# Patient Record
Sex: FEMALE | Race: WHITE
[De-identification: names, ages, dates, MRNs, and addresses within clinical notes are randomized per-mention and may not be internally consistent; named-entity substitution may affect disease eponyms.]

---

## 2018-10-15 ENCOUNTER — HOSPITAL ENCOUNTER (OUTPATIENT)
Dept: HOSPITAL 47 - RADMAMWWP | Age: 43
Discharge: HOME | End: 2018-10-15
Payer: COMMERCIAL

## 2018-10-15 DIAGNOSIS — Z12.31: Primary | ICD-10-CM

## 2018-10-15 PROCEDURE — 77067 SCR MAMMO BI INCL CAD: CPT

## 2018-10-16 NOTE — MM
Reason for exam: screening  (asymptomatic).



Physical Findings:

A clinical breast exam by your physician is recommended on an annual basis and 

results should be correlated with mammographic findings.



MG Screening Mammo w CAD

Bilateral CC and MLO view(s) were taken.

No prior studies available for comparison.

There are scattered fibroglandular densities.  There is a 8mm low density anterior

depth lower inner quadrant mass that appears circumscribed in the left breast.





ASSESSMENT: Incomplete: need additional imaging evaluation, BI-RAD 0



RECOMMENDATION:

Ultrasound of the left breast.



[facility] will attempt to contact patient  to return for ultrasound.

## 2018-10-24 ENCOUNTER — HOSPITAL ENCOUNTER (OUTPATIENT)
Dept: HOSPITAL 47 - RADUSWWP | Age: 43
Discharge: HOME | End: 2018-10-24
Payer: COMMERCIAL

## 2018-10-24 DIAGNOSIS — R92.8: Primary | ICD-10-CM

## 2018-10-25 NOTE — USB
Reason for exam: additional evaluation requested from abnormal screening.



Physical Findings:

Nurse did not find any significant physical abnormalities on exam.



US Breast Workup Limited LT

Left limited breast ultrasound including focal area of concern, retroareolar and 

axilla demonstrates a 0.6 x 0.6 x 0.4cm cystic lesion at 7 o'clock.



These results were verbally communicated with the patient and result sheet given 

to the patient on 10/24/18.





ASSESSMENT: Benign, BI-RAD 2



RECOMMENDATION:

Return to routine screening mammogram schedule for both breasts.

## 2019-11-01 ENCOUNTER — HOSPITAL ENCOUNTER (OUTPATIENT)
Dept: HOSPITAL 47 - RADMAMWWP | Age: 44
Discharge: HOME | End: 2019-11-01
Attending: FAMILY MEDICINE
Payer: COMMERCIAL

## 2019-11-01 DIAGNOSIS — Z12.31: Primary | ICD-10-CM

## 2019-11-01 PROCEDURE — 77067 SCR MAMMO BI INCL CAD: CPT

## 2019-11-04 NOTE — MM
Reason for exam: screening  (asymptomatic).

Last mammogram was performed 1 year and 1 month ago.



Physical Findings:

A clinical breast exam by your physician is recommended on an annual basis and 

results should be correlated with mammographic findings.



MG Screening Mammo w CAD

Bilateral CC, MLO, and XCCL view(s) were taken.

Prior study comparison: October 15, 2018, bilateral MG screening mammo w CAD.

There are scattered fibroglandular densities.  There is chronic nodularity in the 

left breast.  No significant changes when compared with prior studies.





ASSESSMENT: Benign, BI-RAD 2



RECOMMENDATION:

Routine screening mammogram of both breasts in 1 year.

## 2020-02-12 ENCOUNTER — HOSPITAL ENCOUNTER (OUTPATIENT)
Dept: HOSPITAL 47 - RADCTMAIN | Age: 45
End: 2020-02-12
Attending: CLINICAL NURSE SPECIALIST
Payer: COMMERCIAL

## 2020-02-12 DIAGNOSIS — Z98.890: ICD-10-CM

## 2020-02-12 DIAGNOSIS — R16.0: ICD-10-CM

## 2020-02-12 DIAGNOSIS — K57.90: Primary | ICD-10-CM

## 2020-02-12 DIAGNOSIS — K76.0: ICD-10-CM

## 2020-02-12 LAB — BUN SERPL-SCNC: 11 MG/DL (ref 7–17)

## 2020-02-12 PROCEDURE — 74177 CT ABD & PELVIS W/CONTRAST: CPT

## 2020-02-12 PROCEDURE — 84520 ASSAY OF UREA NITROGEN: CPT

## 2020-02-12 PROCEDURE — 36415 COLL VENOUS BLD VENIPUNCTURE: CPT

## 2020-02-12 PROCEDURE — 82565 ASSAY OF CREATININE: CPT

## 2020-04-01 ENCOUNTER — HOSPITAL ENCOUNTER (OUTPATIENT)
Dept: HOSPITAL 47 - EC | Age: 45
Setting detail: OBSERVATION
LOS: 1 days | Discharge: HOME | End: 2020-04-02
Attending: FAMILY MEDICINE | Admitting: FAMILY MEDICINE
Payer: COMMERCIAL

## 2020-04-01 DIAGNOSIS — Z80.0: ICD-10-CM

## 2020-04-01 DIAGNOSIS — E11.9: ICD-10-CM

## 2020-04-01 DIAGNOSIS — Z90.49: ICD-10-CM

## 2020-04-01 DIAGNOSIS — Z79.84: ICD-10-CM

## 2020-04-01 DIAGNOSIS — Z91.018: ICD-10-CM

## 2020-04-01 DIAGNOSIS — Z79.899: ICD-10-CM

## 2020-04-01 DIAGNOSIS — E66.9: ICD-10-CM

## 2020-04-01 DIAGNOSIS — I20.0: Primary | ICD-10-CM

## 2020-04-01 DIAGNOSIS — R20.2: ICD-10-CM

## 2020-04-01 DIAGNOSIS — Z79.890: ICD-10-CM

## 2020-04-01 DIAGNOSIS — Z90.710: ICD-10-CM

## 2020-04-01 DIAGNOSIS — E89.0: ICD-10-CM

## 2020-04-01 DIAGNOSIS — I10: ICD-10-CM

## 2020-04-01 LAB
ALBUMIN SERPL-MCNC: 4.4 G/DL (ref 3.5–5)
ALP SERPL-CCNC: 102 U/L (ref 38–126)
ALT SERPL-CCNC: 25 U/L (ref 4–34)
ANION GAP SERPL CALC-SCNC: 11 MMOL/L
APTT BLD: 25.4 SEC (ref 22–30)
AST SERPL-CCNC: 26 U/L (ref 14–36)
BASOPHILS # BLD AUTO: 0 K/UL (ref 0–0.2)
BASOPHILS NFR BLD AUTO: 0 %
BUN SERPL-SCNC: 9 MG/DL (ref 7–17)
CALCIUM SPEC-MCNC: 9.8 MG/DL (ref 8.4–10.2)
CHLORIDE SERPL-SCNC: 101 MMOL/L (ref 98–107)
CO2 SERPL-SCNC: 23 MMOL/L (ref 22–30)
D DIMER PPP FEU-MCNC: 0.41 MG/L FEU (ref ?–0.6)
EOSINOPHIL # BLD AUTO: 0.2 K/UL (ref 0–0.7)
EOSINOPHIL NFR BLD AUTO: 2 %
ERYTHROCYTE [DISTWIDTH] IN BLOOD BY AUTOMATED COUNT: 5.16 M/UL (ref 3.8–5.4)
ERYTHROCYTE [DISTWIDTH] IN BLOOD: 13 % (ref 11.5–15.5)
GLUCOSE BLD-MCNC: 92 MG/DL (ref 75–99)
GLUCOSE SERPL-MCNC: 164 MG/DL (ref 74–99)
HCT VFR BLD AUTO: 43.4 % (ref 34–46)
HGB BLD-MCNC: 14.6 GM/DL (ref 11.4–16)
INR PPP: 0.9 (ref ?–1.2)
LYMPHOCYTES # SPEC AUTO: 2.4 K/UL (ref 1–4.8)
LYMPHOCYTES NFR SPEC AUTO: 24 %
MAGNESIUM SPEC-SCNC: 1.9 MG/DL (ref 1.6–2.3)
MCH RBC QN AUTO: 28.3 PG (ref 25–35)
MCHC RBC AUTO-ENTMCNC: 33.7 G/DL (ref 31–37)
MCV RBC AUTO: 84.1 FL (ref 80–100)
MONOCYTES # BLD AUTO: 0.2 K/UL (ref 0–1)
MONOCYTES NFR BLD AUTO: 2 %
NEUTROPHILS # BLD AUTO: 7.1 K/UL (ref 1.3–7.7)
NEUTROPHILS NFR BLD AUTO: 70 %
PLATELET # BLD AUTO: 324 K/UL (ref 150–450)
POTASSIUM SERPL-SCNC: 4.3 MMOL/L (ref 3.5–5.1)
PROT SERPL-MCNC: 8 G/DL (ref 6.3–8.2)
PT BLD: 9.4 SEC (ref 9–12)
SODIUM SERPL-SCNC: 135 MMOL/L (ref 137–145)
WBC # BLD AUTO: 10.2 K/UL (ref 3.8–10.6)

## 2020-04-01 PROCEDURE — 84484 ASSAY OF TROPONIN QUANT: CPT

## 2020-04-01 PROCEDURE — 36415 COLL VENOUS BLD VENIPUNCTURE: CPT

## 2020-04-01 PROCEDURE — 85025 COMPLETE CBC W/AUTO DIFF WBC: CPT

## 2020-04-01 PROCEDURE — 93005 ELECTROCARDIOGRAM TRACING: CPT

## 2020-04-01 PROCEDURE — 83880 ASSAY OF NATRIURETIC PEPTIDE: CPT

## 2020-04-01 PROCEDURE — 85379 FIBRIN DEGRADATION QUANT: CPT

## 2020-04-01 PROCEDURE — 80053 COMPREHEN METABOLIC PANEL: CPT

## 2020-04-01 PROCEDURE — 96366 THER/PROPH/DIAG IV INF ADDON: CPT

## 2020-04-01 PROCEDURE — 83690 ASSAY OF LIPASE: CPT

## 2020-04-01 PROCEDURE — 93306 TTE W/DOPPLER COMPLETE: CPT

## 2020-04-01 PROCEDURE — 99291 CRITICAL CARE FIRST HOUR: CPT

## 2020-04-01 PROCEDURE — 85610 PROTHROMBIN TIME: CPT

## 2020-04-01 PROCEDURE — 96365 THER/PROPH/DIAG IV INF INIT: CPT

## 2020-04-01 PROCEDURE — 93351 STRESS TTE COMPLETE: CPT

## 2020-04-01 PROCEDURE — 96376 TX/PRO/DX INJ SAME DRUG ADON: CPT

## 2020-04-01 PROCEDURE — 80061 LIPID PANEL: CPT

## 2020-04-01 PROCEDURE — 85730 THROMBOPLASTIN TIME PARTIAL: CPT

## 2020-04-01 PROCEDURE — 83735 ASSAY OF MAGNESIUM: CPT

## 2020-04-01 PROCEDURE — 71046 X-RAY EXAM CHEST 2 VIEWS: CPT

## 2020-04-01 RX ADMIN — INSULIN ASPART SCH: 100 INJECTION, SOLUTION INTRAVENOUS; SUBCUTANEOUS at 20:13

## 2020-04-01 RX ADMIN — INSULIN ASPART SCH: 100 INJECTION, SOLUTION INTRAVENOUS; SUBCUTANEOUS at 16:42

## 2020-04-01 NOTE — P.CRDCN
History of Present Illness


History of present illness: 





HISTORY OF PRESENTING ILLNESS


This is a pleasant 44-year-old female past medical history significant for 

diabetes mellitus and hypertension. She denies prior history of coronary artery 

disease and does not follow in the office with a cardiologist for any reason. We

have been asked to see in consultation for chest pain. She states last night she

laid down to go to sleep and started noticing a pain in the left precordial 

region with radiation up the left side of her neck and associated with tingling 

in the left arm. She tried changing positions and laying on her right side or 

back. Her symptoms persisted with no specific aggravating or alleviating 

factors. They lasted all through the night without relief. She was unable to 

sleep. She had some associated nausea and did vomit once this morning. She took 

tylenol at 0530 and achieved some mild relief until around 0830 but then the sy

mptoms returned. No shortness of breath, dizziness or palpitations. Upon arrival

she was continuing to have chest pain. SL nitro was given and this did relieve 

her symptoms. She is currently chest pain free. 





DIAGNOSTICS


EKG reveals sinus mechanism with no acute ischemic changes. EKG was obtained w

hile she was having active chest pain.


Chest xray revealed mild interstitial prominence suggestive of bronchitis.


Laboratory reviewed, CBC unremarkable, d-dimer 0.41, sodium 135, potassium 4.3, 

creatinine 0.52, troponin negative x1, NT proBNP 48.


Current cardiac medications include losartan . 





REVIEW OF SYSTEMS


At the time of my exam:


CONSTITUTIONAL: Denies fever or chills.


CARDIOVASCULAR: Denies chest pain, shortness of breath, orthopnea, PND or 

palpitations.


RESPIRATORY: Denies cough. 


GASTROINTESTINAL: Denies abdominal pain, diarrhea, constipation, nausea or 

vomiting.


MUSCULOSKELETAL: Denies myalgias.


NEUROLOGIC: Denies numbness, tingling or weakness.


ENDOCRINE: Denies fatigue, weight change,  polydipsia or polyurina.


GENITOURINARY: Denies burning, hematuria or urgency with micturation.


HEMATOLOGIC: Denies history of anemia or bleeding. 





PHYSICAL EXAMINATION


Blood pressure 138/95 heart rate 101 afebrile and maintaining oxygen saturation 

on room air.


CONSTITUTIONAL: No apparent distress. Obese.


HEENT: Head is normocephalic. Pupils are equal, round. Sclerae anicteric. Mucous

membranes of the mouth are moist.  No JVD. No carotid bruit.


CHEST EXAMINATION: Lungs are clear to auscultation. No chest wall tenderness is 

noted on palpation or with deep breathing. 


HEART EXAMINATION: Regular rate and rhythm. S1, S2 heard. No murmurs, gallops or

rub.


ABDOMEN: Soft, nontender. Positive bowel sounds.


EXTREMITIES: 2+ peripheral pulses, no lower extremity edema and no calf 

tenderness.


NEUROLOGIC EXAMINATION: Patient is awake, alert and oriented x3. 





ASSESSMENT


Chest pain, atypical. 


Hypertension


Diabetes mellitus


Hypothyroidism


Obesity, BMI 30





PLAN


Continue to obtain serial cardiac enzymes to rule out an acute event. 

Discontinue heparin after negative troponins x2. 


Obtain 2D echocardiogram and doppler study to assess cardiac structure and 

function. 


Initiate atorvastatin 40 mg daily for primary prevention given her history of 

diabetes mellitus. 


Resume losartan as previously ordered. 


If enzymes are normal we will proceed with dobutamine stress echo in the 

morning. If stress testing normal would consider GI source. Gallbladder has been

removed.


Thank you kindly for this consultation. 





Nurse Practitioner note has been reviewed, I agree with a documented findings 

and plan of care.  Patient was seen and examined.











Past Medical History


Past Medical History: Diabetes Mellitus, Hypertension, Thyroid Disorder


Additional Past Medical History / Comment(s): NIDDM type II, thyroid 

nodules/surgery


History of Any Multi-Drug Resistant Organisms: None Reported


Past Surgical History: Cholecystectomy, Hysterectomy


Additional Past Surgical History / Comment(s): partial thyroidectomy


Past Anesthesia/Blood Transfusion Reactions: No Reported Reaction


Smoking Status: Never smoker





- Past Family History


  ** Sister(s)


Family Medical History: No Reported History





  ** Mother


Family Medical History: Cancer


Additional Family Medical History / Comment(s): Mother  of stomach cancer at

the age of 55yrs.





  ** Father


Family Medical History: No Reported History


Additional Family Medical History / Comment(s): Father  from "old age" at 

the age of 90yrs.





Medications and Allergies


                                Home Medications











 Medication  Instructions  Recorded  Confirmed  Type


 


Levothyroxine Sodium [Synthroid] 25 mcg PO DAILY 20 History


 


Losartan [Cozaar] 50 mg PO DAILY 20 History


 


metFORMIN HCL [Glucophage] 500 mg PO BID 20 History








                                    Allergies











Allergy/AdvReac Type Severity Reaction Status Date / Time


 


turkey Allergy  Anaphylaxis Uncoded 20 12:32














Physical Exam


Vitals: 


                                   Vital Signs











  Temp Pulse Pulse Resp BP BP Pulse Ox


 


 20 13:17  98.7 F   101 H  18   138/95  98


 


 20 12:32   93   17  142/81   96


 


 20 11:43   94   17  158/94   94 L


 


 20 10:50  98.2 F  100   18  188/110   97








                                Intake and Output











 20





 22:59 06:59 14:59


 


Intake Total   350


 


Balance   350


 


Intake:   


 


  Oral   350


 


Other:   


 


  Weight   83.915 kg














Results





                                 20 11:07





                                 20 11:07


                                 Cardiac Enzymes











  20 Range/Units





  11:07 11:07 


 


AST  26   (14-36)  U/L


 


Troponin I   <0.012  (0.000-0.034)  ng/mL








                                   Coagulation











  20 Range/Units





  11:07 


 


PT  9.4  (9.0-12.0)  sec


 


APTT  25.4  (22.0-30.0)  sec








                                       CBC











  20 Range/Units





  11:07 


 


WBC  10.2  (3.8-10.6)  k/uL


 


RBC  5.16  (3.80-5.40)  m/uL


 


Hgb  14.6  (11.4-16.0)  gm/dL


 


Hct  43.4  (34.0-46.0)  %


 


Plt Count  324  (150-450)  k/uL








                          Comprehensive Metabolic Panel











  20 Range/Units





  11:07 


 


Sodium  135 L  (137-145)  mmol/L


 


Potassium  4.3  (3.5-5.1)  mmol/L


 


Chloride  101  ()  mmol/L


 


Carbon Dioxide  23  (22-30)  mmol/L


 


BUN  9  (7-17)  mg/dL


 


Creatinine  0.52  (0.52-1.04)  mg/dL


 


Glucose  164 H  (74-99)  mg/dL


 


Calcium  9.8  (8.4-10.2)  mg/dL


 


AST  26  (14-36)  U/L


 


ALT  25  (4-34)  U/L


 


Alkaline Phosphatase  102  ()  U/L


 


Total Protein  8.0  (6.3-8.2)  g/dL


 


Albumin  4.4  (3.5-5.0)  g/dL








                               Current Medications











Generic Name Dose Route Start Last Admin





  Trade Name Freq  PRN Reason Stop Dose Admin


 


Aspirin  81 mg  20 09:00 





  Aspirin  PO  





  DAILY [facility]  


 


Atorvastatin Calcium  40 mg  20 21:00 





  Lipitor  PO  





  HS [facility]  


 


Heparin Sodium (Porcine)  0 unit  20 12:15 





  Heparin  IV  





  Q6HR PRN  





  Low PTT  





  Protocol  


 


Heparin Sodium/Sodium Chloride  250 mls @ 10.07 mls/hr  20 12:15  20

 12:31





  25,000 unit/ Sodium Chloride  IV   12 units/kg/hr





  .Q24H BIPIN   10.07 mls/hr





    Administration





  Protocol  





  12 UNITS/KG/HR  


 


Losartan Potassium  50 mg  20 09:00 





  Cozaar  PO  





  DAILY [facility]  


 


Nitroglycerin  0.4 mg  20 12:15 





  Nitrostat  SUBLINGUAL  





  Q5M PRN  





  Chest Pain  








                                Intake and Output











 20





 22:59 06:59 14:59


 


Intake Total   350


 


Balance   350


 


Intake:   


 


  Oral   350


 


Other:   


 


  Weight   83.915 kg








                                 Patient Weight











 20





 06:59


 


Weight 83.915 kg








                                        





                                 20 11:07 





                                 20 11:07

## 2020-04-01 NOTE — ED
Chest Pain HPI





- General


Chief Complaint: Chest Pain


Stated Complaint: SOB, LT ARM AND NECK PAIN


Time Seen by Provider: 04/01/20 10:55


Source: patient, RN notes reviewed


Mode of arrival: ambulatory


Limitations: no limitations





- History of Present Illness


Initial Comments: 





This is a 44-year-old female presents emergency Department chief complaint chest

pain.  Patient states symptoms started last night.  She states is very difficult

to sleep on her left side secondary to the pain.  She states pain worsens 

morning radiating up into her neck, left arm.  She has no significant cardiac 

history though she does have a history of high blood pressure, diabetes.  

Patient did not take her blood pressure medication this morning.  She reports no

fevers or chills no chest congestion or cough.  Patient has no abdominal 

complaints some back pain.  Patient has no significant family cardiac disease.





- Related Data


                                Home Medications











 Medication  Instructions  Recorded  Confirmed


 


EPINEPHrine (Auto Inject) [Epipen] 0.3 mg IM ONCE PRN 07/21/14 07/21/14








                                  Previous Rx's











 Medication  Instructions  Recorded


 


Hydrocodone/Acetaminophen [Norco 1 - 2 each PO Q6HR PRN #24 tab 07/23/14





5-325]  











                                    Allergies











Allergy/AdvReac Type Severity Reaction Status Date / Time


 


turkey Allergy  Anaphylaxis Uncoded 04/01/20 10:54














Review of Systems


ROS Statement: 


Those systems with pertinent positive or pertinent negative responses have been 

documented in the HPI.





ROS Other: All systems not noted in ROS Statement are negative.





EKG Findings





- EKG Comments:


EKG Findings:: EKG performed at 11:04 normal sinus rhythm rate of 98  QRS 

74 QT//449





Past Medical History


Past Medical History: Diabetes Mellitus, Hypertension, Thyroid Disorder


History of Any Multi-Drug Resistant Organisms: None Reported


Past Surgical History: Hysterectomy


Additional Past Surgical History / Comment(s): partial thyroidectomy 2 years 

ago. no problems with anesthesia


Past Psychological History: No Psychological Hx Reported


Smoking Status: Never smoker


Past Alcohol Use History: None Reported


Past Drug Use History: None Reported





- Past Family History


  ** Sister(s)


Family Medical History: No Reported History





General Exam


Limitations: no limitations


General appearance: alert, in no apparent distress


Head exam: Present: atraumatic, normocephalic, normal inspection


Eye exam: Present: normal appearance, PERRL, EOMI.  Absent: scleral icterus, 

conjunctival injection, periorbital swelling


ENT exam: Present: normal exam, normal oropharynx, mucous membranes moist


Neck exam: Present: normal inspection, full ROM.  Absent: tenderness, 

meningismus, lymphadenopathy


Respiratory exam: Present: normal lung sounds bilaterally, chest wall 

tenderness.  Absent: respiratory distress, wheezes, rales, rhonchi, stridor


Cardiovascular Exam: Present: regular rate, normal rhythm, normal heart sounds. 

Absent: systolic murmur, diastolic murmur, rubs, gallop, clicks


GI/Abdominal exam: Present: soft, normal bowel sounds.  Absent: distended, 

tenderness, guarding, rebound, rigid





Course


                                   Vital Signs











  04/01/20 04/01/20





  10:50 11:43


 


Temperature 98.2 F 


 


Pulse Rate 100 94


 


Respiratory 18 17





Rate  


 


Blood Pressure 188/110 158/94


 


O2 Sat by Pulse 97 94 L





Oximetry  














Chest Pain MDM





- MDM





44-year-old female presented for chest pain.  Patient's workup is negative this 

time blood pressure is improved patient relief with nitro.  Patient be admitted 

for unstable angina.  Patient was started on heparin.





Critical Care Time


Critical Care Time: Yes


Total Critical Care Time: 35


Critical Care Time: 





Total 35 minutes of critical care time he is initially evaluated the patient 

carries a past medical history according labs and medications.  Patient found to

have unstable angina patient's pain improved with nitro.  Patient was started on

heparin.  Patient will be admitted with cardiology evaluation will have repeat 

troponins, echo.





Disposition


Clinical Impression: 


 Unstable angina





Disposition: ADMITTED [facility] IP TO [facility] [place]


Condition: Fair


Referrals: 


Susie Sin DO [Primary Care Provider] - 1-2 days


Time of Disposition: 12:15

## 2020-04-01 NOTE — ECHOF
Referral Reason:chest pain



MEASUREMENTS

--------

HEIGHT: 165.1 cm

WEIGHT: 83.9 kg

BP: 

IVSd:   1.2 cm     (0.6 - 1.1)

LVIDd:   3.7 cm     (3.9 - 5.3)

LVPWd:   1.5 cm     (0.6 - 1.1)

IVSs:   1.1 cm

LVIDs:   2.7 cm

LVPWs:   2.1 cm

MV E Thien:   0.56 m/s

MV DecT:   122 ms

MV A Thien:   0.71 m/s

MV E/A Ratio:   0.80 

RAP:   5.00 mmHg

RVSP:   10.41 mmHg







FINDINGS

--------

Sinus rhythm.

This was a technically difficult study with suboptimal views.

The left ventricular size is normal.   There is mild concentric left ventricular hypertrophy.   Overa
ll left ventricular systolic function is normal with, an EF between 55 - 60 %.

The RV was not well visualized.

The left atrial size is normal.

The right atrium was not well visualized.

Lumason used

The aortic valve was not well visualized.

The mitral valve was not well visualized.

The tricuspid valve was not well visualized.

The pulmonic valve was not well visualized.

There is no pericardial effusion.



CONCLUSIONS

--------

1. This was a technically difficult study with suboptimal views.

2. There is mild concentric left ventricular hypertrophy.

3. Overall left ventricular systolic function is normal with, an EF between 55 - 60 %.

4. The RV was not well visualized.

5. The left atrial size is normal.

6. Lumason used

7. The aortic valve was not well visualized.

8. The mitral valve was not well visualized.

9. The tricuspid valve was not well visualized.

10. The pulmonic valve was not well visualized.

11. There is no pericardial effusion.





SONOGRAPHER: Elva Bower RDCS

## 2020-04-01 NOTE — XR
EXAMINATION TYPE: XR chest 2V

 

DATE OF EXAM: 4/1/2020

 

COMPARISON: None

 

HISTORY: 44-year-old female with chest pain

 

TECHNIQUE:  PA and lateral views

 

FINDINGS:  

The cardiomediastinal silhouette, aorta, and pulmonary vasculature are within normal limits. Mild int
erstitial prominence as a chronic appearance. No consolidation or pleural effusion.

 

 

IMPRESSION:  

Mild interstitial prominence could be chronic or could reflect bronchitis or asthma. No focal infiltr
ate.

## 2020-04-02 VITALS
RESPIRATION RATE: 18 BRPM | HEART RATE: 80 BPM | TEMPERATURE: 98.3 F | DIASTOLIC BLOOD PRESSURE: 71 MMHG | SYSTOLIC BLOOD PRESSURE: 102 MMHG

## 2020-04-02 LAB
CHOLEST SERPL-MCNC: 181 MG/DL (ref ?–200)
GLUCOSE BLD-MCNC: 100 MG/DL (ref 75–99)
GLUCOSE BLD-MCNC: 130 MG/DL (ref 75–99)
HDLC SERPL-MCNC: 42 MG/DL (ref 40–60)
LDLC SERPL CALC-MCNC: 88 MG/DL (ref 0–99)
TRIGL SERPL-MCNC: 253 MG/DL (ref ?–150)

## 2020-04-02 RX ADMIN — INSULIN ASPART SCH: 100 INJECTION, SOLUTION INTRAVENOUS; SUBCUTANEOUS at 07:12

## 2020-04-02 NOTE — P.PN
Subjective





HISTORY OF PRESENTING ILLNESS


This is a pleasant 44-year-old female past medical history significant for 

diabetes mellitus and hypertension. She denies prior history of coronary artery 

disease and does not follow in the office with a cardiologist for any reason. 

She has had no further symptoms of chest pain since admission. Cardiac enzymes 

are negative x3. LDL 88, HDL 42. Echocardiogram revealed preserved LV systolic 

function with EF 55-60%. Valves were difficult to visualize. Stress test 

performed is negative for stress induced ischemia. Blood pressure 102/71 heart 

rate 80 afebrile and maintaining oxygen saturation on room air. 





PHYSICAL EXAMINATION


CONSTITUTIONAL: No apparent distress. Obese.


HEENT: Head is normocephalic. Pupils are equal, round. Sclerae anicteric. Mucous

membranes of the mouth are moist.  No JVD. No carotid bruit.


CHEST EXAMINATION: Lungs are clear to auscultation. No chest wall tenderness is 

noted on palpation or with deep breathing. 


HEART EXAMINATION: Regular rate and rhythm. S1, S2 heard. No murmurs, gallops or

rub.


EXTREMITIES: 2+ peripheral pulses, no lower extremity edema and no calf 

tenderness. 





ASSESSMENT


Chest pain, atypical. 


Hypertension


Diabetes mellitus


Hypothyroidism


Obesity, BMI 30





PLAN


Stable for discharge from a cardiac perspective. 


Follow up in the office with Dr. Madden in 4 weeks. 


Consider GI etiology for pain, possibly PPI. 





Nurse Practitioner note has been reviewed, I agree with a documented findings 

and plan of care.  Patient was seen and examined.





Objective





- Vital Signs


Vital signs: 


                                   Vital Signs











Temp  98.3 F   04/02/20 07:16


 


Pulse  80   04/02/20 07:16


 


Resp  18   04/02/20 07:16


 


BP  102/71   04/02/20 07:16


 


Pulse Ox  96   04/02/20 07:16








                                 Intake & Output











 04/01/20 04/02/20 04/02/20





 18:59 06:59 18:59


 


Intake Total 800 480 


 


Output Total  200 


 


Balance 800 280 


 


Weight 83.915 kg  


 


Intake:   


 


  Oral 800 480 


 


Output:   


 


  Urine/Stool Mix  200 


 


Other:   


 


  Voiding Method Toilet Toilet Toilet


 


  # Voids  1 














- Labs


CBC & Chem 7: 


                                 04/01/20 11:07





                                 04/01/20 11:07


Labs: 


                  Abnormal Lab Results - Last 24 Hours (Table)











  04/01/20 04/01/20 04/02/20 Range/Units





  11:07 11:07 07:01 


 


Sodium  135 L    (137-145)  mmol/L


 


Glucose  164 H    (74-99)  mg/dL


 


POC Glucose (mg/dL)    100 H  (75-99)  mg/dL


 


Triglycerides   253 H   (<150)  mg/dL

## 2020-04-02 NOTE — P.DS
Providers


Date of admission: 


04/01/20 12:19





Expected date of discharge: 04/02/20


Attending physician: 


Nabil Cobb MD





Consults: 





                                        





04/01/20 12:16


Consult Physician Urgent 


   Consulting Provider: Samuel Serra


   Consult Reason/Comments: chest pain


   Do you want consulting provider notified?: Yes











Primary care physician: 


Susie Sin








- Discharge Diagnosis(es)


(1) Essential hypertension


Status: Acute   





(2) Type 2 diabetes mellitus


Status: Acute   





(3) Unstable angina


Status: Acute   


Hospital Course: 








Clarisa Valdez is a 45 yo F with PMH HTN, T2DM who presented to the ED 

complaining of chest pain. She states last night she laid down to go to sleep 

and started noticing a pain in the left precordial region with radiation up the 

left side of her neck and associated with tingling in the left arm. She tried 

changing positions and laying on her right side or back. Her symptoms persisted 

with no specific aggravating or alleviating factors. They lasted all through the

night without relief. She was unable to sleep. She had some associated nausea an

d did vomit once this morning. She took tylenol at 0530 and achieved some mild 

relief until around 0830 but then the symptoms returned. No shortness of breath,

dizziness or palpitations. On presentation she was experiencing chest pain which

was relieved by SL nitro. Labs unremarkable, trop negative. Pt was admitted to 

observation and seen by cardiology. She underwent echocardiogram which was unrem

arkable and subsequent dobutamine stress echo and EKG which did show 1 mm 

resting ST segment depression but no inducible ischmic changes. She is 

discharged in stable condition and recommended to follow up with her PCP as an 

outpatient.





Discharge Exam:


General: well nourished, well developed, NAD. Vitals reviewed


HENT: normocephalic, mucus membranes moist


Lungs: normal respiratory effort, no wheezes or rales


CV: Regular rate and rhythm, no murmur. Peripheral pulses 2+


Abdomen: soft, nondistended, no organomegaly


Skin: warm and dry.


Neuro: A&Ox3, normal mood and affect


Patient Condition at Discharge: Fair





Plan - Discharge Summary


Discharge Rx Participant: No


New Discharge Prescriptions: 


New


   Atorvastatin [Lipitor] 40 mg PO HS #30 tab





Continue


   metFORMIN HCL [Glucophage] 500 mg PO BID


   Losartan [Cozaar] 50 mg PO DAILY


   Levothyroxine Sodium [Synthroid] 25 mcg PO DAILY


Discharge Medication List





Levothyroxine Sodium [Synthroid] 25 mcg PO DAILY 04/01/20 [History]


Losartan [Cozaar] 50 mg PO DAILY 04/01/20 [History]


metFORMIN HCL [Glucophage] 500 mg PO BID 04/01/20 [History]


Atorvastatin [Lipitor] 40 mg PO HS #30 tab 04/02/20 [Rx]








Follow up Appointment(s)/Referral(s): 


Dora Madden MD [STAFF PHYSICIAN] - 4 Weeks


Susie Sin DO [Primary Care Provider] - 3 Days


Patient Instructions/Handouts:  Atorvastatin (By mouth), Chest Pain (GEN)


Discharge Disposition: HOME SELF-CARE

## 2020-04-02 NOTE — ECHOS
STRESS ECHOCARDIOGRAM



INDICATIONS:

Chest pain.



MEDICATIONS:

Metformin, losartan, levothyroxine.



BASELINE HEART RATE:

79



BASELINE BLOOD PRESSURE:

132/102



MAXIMUM HEART RATE:

149



MAXIMUM BLOOD PRESSURE:

155/64



85% MPHR:

148



100% MPHR:

174



MAXIMUM STAGE REACHED:

4



TOTAL EXERCISE TIME:

12:38



CLINICAL INFORMATION:

Baseline rhythm is a sinus mechanism, rate of 79, normal axis intervals, nonspecific ST-

T wave changes. Baseline blood pressure 132/102 mmHg. Patient received infusion of

dobutamine and 1 mg intravenous atropine.  Peak rate 149 beats per minute which is

equal to 85% maximum predicted heart rate.  Peak blood pressure 155/64 mmHg.

Electrocardiograph monitoring revealed no evidence of diagnostic ischemic ST deviation.



FINDINGS:

Baseline echocardiogram revealed normal wall motion.  At peak exercise, there was

normal wall motion augmentation with no hypokinesis or dyskinesis.



CONCLUSION:

1. Nondiagnostic electrocardiograph dobutamine stress test, secondary to baseline EKG

    abnormality with 1 mm ST-segment depression.

2. Rare premature ventricular contractions.

3. Normal stress echocardiogram with no evidence of stress-induced ischemia.





MMODL / IJN: [id number] / Job#: [id number]

## 2020-04-02 NOTE — P.HPIM
History of Present Illness


H&P Date: 20


Chief Complaint: chest pain





Clarisa Valdez is a 45 yo F with PMH HTN, T2DM who presented to the ED 

complaining of chest pain. She states last night she laid down to go to sleep 

and started noticing a pain in the left precordial region with radiation up the 

left side of her neck and associated with tingling in the left arm. She tried 

changing positions and laying on her right side or back. Her symptoms persisted 

with no specific aggravating or alleviating factors. They lasted all through the

night without relief. She was unable to sleep. She had some associated nausea 

and did vomit once this morning. She took tylenol at 0530 and achieved some mild

relief until around 0830 but then the symptoms returned. No shortness of breath,

dizziness or palpitations. On presentation she was experiencing chest pain which

was relieved by SL nitro. Labs unremarkable, trop negative. She is currently 

chest pain free. 





Review of Systems


All systems: negative


Constitutional: Denies chills, Denies fever


Eyes: denies blurred vision, denies pain


Ears, nose, mouth and throat: Denies headache, Denies sore throat


Cardiovascular: Reports chest pain, Denies shortness of breath


Respiratory: Denies cough


Gastrointestinal: Denies abdominal pain, Denies diarrhea, Denies nausea, Denies 

vomiting


Genitourinary: Denies dysuria, Denies hematuria


Musculoskeletal: Denies myalgias


Integumentary: Denies pruritus, Denies rash


Neurological: Denies numbness, Denies weakness


Psychiatric: Denies anxiety, Denies depression


Endocrine: Denies fatigue, Denies weight change





Past Medical History


Past Medical History: Diabetes Mellitus, Hypertension, Thyroid Disorder


Additional Past Medical History / Comment(s): NIDDM type II, thyroid 

nodules/surgery


History of Any Multi-Drug Resistant Organisms: None Reported


Past Surgical History: Cholecystectomy, Hysterectomy


Additional Past Surgical History / Comment(s): partial thyroidectomy


Past Anesthesia/Blood Transfusion Reactions: No Reported Reaction


Smoking Status: Never smoker





- Past Family History


  ** Sister(s)


Family Medical History: No Reported History





  ** Mother


Family Medical History: Cancer


Additional Family Medical History / Comment(s): Mother  of stomach cancer at

the age of 55yrs.





  ** Father


Family Medical History: No Reported History


Additional Family Medical History / Comment(s): Father  from "old age" at 

the age of 90yrs.





Medications and Allergies


                                Home Medications











 Medication  Instructions  Recorded  Confirmed  Type


 


Levothyroxine Sodium [Synthroid] 25 mcg PO DAILY 20 History


 


Losartan [Cozaar] 50 mg PO DAILY 20 History


 


metFORMIN HCL [Glucophage] 500 mg PO BID 20 History


 


Atorvastatin [Lipitor] 40 mg PO HS #30 tab 20  Rx








                                    Allergies











Allergy/AdvReac Type Severity Reaction Status Date / Time


 


turkey Allergy  Anaphylaxis Uncoded 20 12:32














Physical Exam


Vitals: 


                                   Vital Signs











  Temp Pulse Resp BP BP Pulse Ox


 


 20 07:16  98.3 F  80  18  102/71   96


 


 20 03:56    15   


 


 20 03:55  98.1 F  88  15   124/75  97


 


 20 23:00  98.6 F  89  16   141/78  98








                                Intake and Output











 20





 06:59 14:59 22:59


 


Other:   


 


  Voiding Method Toilet Toilet 


 


  # Voids 1  


 


  Weight  83.91 kg 














General: well nourished, well developed, NAD. Vitals reviewed


Eyes: PERRL, EOMI, conjunctiva normal


HENT: normocephalic, mucus membranes moist


Neck: supple, no JVD


Lungs: normal respiratory effort, no wheezes or rales


CV: Regular rate and rhythm, no murmur. Peripheral pulses 2+


Abdomen: soft, nondistended, no organomegaly


Lymph: no cervical or axillary LAD


Skin: warm and dry.


Neuro: A&Ox3, normal mood and affect





Results


CBC & Chem 7: 


                                 20 11:07





                                 20 11:07


Labs: 


                  Abnormal Lab Results - Last 24 Hours (Table)











  20 Range/Units





  11:07 19:43 07:01 


 


POC Glucose (mg/dL)   130 H  100 H  (75-99)  mg/dL


 


Triglycerides  253 H    (<150)  mg/dL














Thrombosis Risk Factor Assmnt





- Choose All That Apply


Any of the Below Risk Factors Present?: Yes


Each Factor Represents 1 point: Age 41-60 years, Obesity (BMI >25)


Other Risk Factors: No


Other congenital or acquired thrombophilia - If yes, enter type in comment: No


Thrombosis Risk Factor Assessment Total Risk Factor Score: 2


Thrombosis Risk Factor Assessment Level: Low Risk





Assessment and Plan


(1) Essential hypertension


Status: Acute   Code(s): I10 - ESSENTIAL (PRIMARY) HYPERTENSION   SNOMED 

Code(s): 95048902


   





(2) Type 2 diabetes mellitus


Status: Acute   Code(s): E11.9 - TYPE 2 DIABETES MELLITUS WITHOUT COMPLICATIONS 

  SNOMED Code(s): 09864485


   





(3) Unstable angina


Status: Acute   Code(s): I20.0 - UNSTABLE ANGINA   SNOMED Code(s): 1588962


   


Plan: 





1. Chest pain. ACS ruled out. Cardiology consulted for further evaluation


2. HTN. Continue losartan


3. T2DM. Continue home metformin. Accucheck, sliding scale

## 2021-07-26 ENCOUNTER — HOSPITAL ENCOUNTER (OUTPATIENT)
Dept: HOSPITAL 47 - RADMAMWWP | Age: 46
Discharge: HOME | End: 2021-07-26
Attending: FAMILY MEDICINE
Payer: COMMERCIAL

## 2021-07-26 DIAGNOSIS — Z12.31: Primary | ICD-10-CM

## 2021-07-26 PROCEDURE — 77067 SCR MAMMO BI INCL CAD: CPT

## 2021-07-29 NOTE — MM
Reason for exam: screening  (asymptomatic).

Last mammogram was performed 1 year and 9 months ago.



Physical Findings:

A clinical breast exam by your physician is recommended on an annual basis and 

results should be correlated with mammographic findings.



MG Screening Mammo w CAD

Bilateral CC, MLO, and XCCL view(s) were taken.

Prior study comparison: November 1, 2019, bilateral MG screening mammo w CAD.  

October 15, 2018, bilateral MG screening mammo w CAD.

There are scattered fibroglandular densities.  There is chronic nodularity in the 

left breast.  No significant changes when compared with prior studies.





ASSESSMENT: Benign, BI-RAD 2



RECOMMENDATION:

Routine screening mammogram of both breasts in 1 year.

## 2022-09-16 ENCOUNTER — HOSPITAL ENCOUNTER (OUTPATIENT)
Dept: HOSPITAL 47 - RADMAMWWP | Age: 47
Discharge: HOME | End: 2022-09-16
Attending: FAMILY MEDICINE
Payer: COMMERCIAL

## 2022-09-16 DIAGNOSIS — Z12.31: Primary | ICD-10-CM

## 2022-09-16 PROCEDURE — 77063 BREAST TOMOSYNTHESIS BI: CPT

## 2022-09-16 PROCEDURE — 77067 SCR MAMMO BI INCL CAD: CPT

## 2022-09-20 NOTE — MM
Reason for Exam: Screening  (asymptomatic). 

Last mammogram was performed 1 year(s) and 2 month(s) ago. 





Patient History: 

Menarche at age 10. First Full-Term Pregnancy at age 15. Hysterectomy at age 38. Patient has history

of breast feeding. 





Risk Values: 

Denisse 5 year model risk: 0.7%.

NCI Lifetime model risk: 6.6%.





Prior Study Comparison: 

10/15/2018 Bilateral Screening Mammogram, [facility]. 11/1/2019 Bilateral Screening Mammogram, [facility].

7/26/2021 Bilateral Screening Mammogram, [facility]. 





Tissue Density: 

There are scattered fibroglandular densities.





Findings: 

Analyzed By CAD. 

There is no suspicious group of microcalcifications or new suspicious mass in either breast. 





Overall Assessment: Negative, BI-RAD 1





Management: 

Screening Mammogram of both breasts in 1 year.

A clinical breast exam by your physician is recommended on an annual basis and results should be

correlated with mammographic findings.



Electronically signed and approved by: Bronson Vizcaino DO

## 2022-10-05 ENCOUNTER — HOSPITAL ENCOUNTER (OUTPATIENT)
Dept: HOSPITAL 47 - RADNMMAIN | Age: 47
Discharge: HOME | End: 2022-10-05
Attending: FAMILY MEDICINE
Payer: COMMERCIAL

## 2022-10-05 DIAGNOSIS — R94.31: ICD-10-CM

## 2022-10-05 DIAGNOSIS — I10: Primary | ICD-10-CM

## 2022-10-05 DIAGNOSIS — I51.7: ICD-10-CM

## 2022-10-05 PROCEDURE — 93017 CV STRESS TEST TRACING ONLY: CPT

## 2022-10-06 NOTE — CA
Exercise Stress Test Report 

 

 Name:    Clarisa Valdez 

 

 MRN:    [id number] 

 

 Exam Date: 10/05/2022 11:02 

 

 Exam Location:      [facility]  

                     Stress 

 

 Ht (in):     65     Wt (lb):     277    BSA:    2.27 

 

 Ordering Phys:       JOSEPH INGRAM 

 

 Referring Phys:      JUAN JOSE, 

 

 Technologist:        Abimael Francisco 

 

 Age:    47    Gender:    F 

 

 :    1975 

 Procedure CPT: 

 

 Indications: 

 

 ICD-10 Codes: 

 

 Patient History:          CHEST PAIN, HTN, DIABETIC 

 

 Medications:              METOPROLOL, LOSARTAN, HZTZ, VIT D,  

                           SYNTHROID, METFORMIN 

 

 Meds past 24 hrs: 

 

 Pretest Chest Pain: 

 

 STRESS TEST      [place] 

 

 Protocol 

 

 

 

 

 Exercise Duration (min:sec):         06:11 

 Max ST Depressions (mm): 

 Angina Score: 

 Tijerina Score: 

 Resting HR (bpm):      83 

 

 Peak HR (bpm):         147 

 

 Resting BP (mmHg):       151    /   64 

 

 Peak BP (mmHg):       215   /   101 

 

 MPHR:    171     Target HR:      145 

 

 % MPHR:     86 

 METS:  7.5 

 

 Total Dose: 

 Peak Dose: 

 Atropine: 

 Double Product:       99738 

 

 BP Response:              Normal Resting Blood Pressure - Appropriate 

 

 Stress Termination:       Fatigue 

 

 Stress Symptoms: 

 No chest pain or symptoms 

 

 Stress Summary: 

 

 

  ECG ANALYSIS 

 

 Resting ECG:     Sinus rhythm. Normal conduction. No arrhythmias.  

                  Non-specific ST-T wave changes. 

 

 Stress ECG:      Sinus rhythm. Normal conduction.  Rate PVCs.  

                  Nonspecific ST-T abnormality. 

 

 CONCLUSIONS 

 1.  Decrease exercise tolerance 

 2.  Nondiagnostic treadmill stress testing secondary to baseline  

 EKG abnormality 

 3.  If clinically indicated and imaging stress test would be  

 helpful. 

 

 Dr. Dora Madden MD 

 (Electronically Signed) 

 Final Date:      2022 13:44

## 2023-10-10 ENCOUNTER — HOSPITAL ENCOUNTER (OUTPATIENT)
Dept: HOSPITAL 47 - RADMAMWWP | Age: 48
Discharge: HOME | End: 2023-10-10
Attending: FAMILY MEDICINE
Payer: COMMERCIAL

## 2023-10-10 DIAGNOSIS — Z12.31: Primary | ICD-10-CM

## 2023-10-10 PROCEDURE — 77063 BREAST TOMOSYNTHESIS BI: CPT

## 2023-10-10 PROCEDURE — 77067 SCR MAMMO BI INCL CAD: CPT

## 2023-10-11 NOTE — MM
Reason for Exam: Screening  (asymptomatic). 

Last mammogram was performed 1 year(s) and 1 month(s) ago. 





Patient History: 

Menarche at age 10. First Full-Term Pregnancy at age 15. Hysterectomy at age 38. Patient has history

of breast feeding. 





Risk Values: 

Denisse 5 year model risk: 0.7%.

NCI Lifetime model risk: 6.5%.





Prior Study Comparison: 

11/1/2019 Bilateral Screening Mammogram, [facility]. 7/26/2021 Bilateral Screening Mammogram, [facility].

9/16/2022 Bilateral MG 3D screening mammo w/cad, [facility]. 





Tissue Density: 

There are scattered fibroglandular densities.





Findings: 

Analyzed By CAD. 

There is chronic nodularity in the left breast.

There is no suspicious group of microcalcifications or new suspicious mass in either breast. 





Overall Assessment: Benign, BI-RAD 2





Management: 

Screening Mammogram of both breasts in 1 year.

.



Patient should continue monthly self-breast exams.  A clinical breast exam by your physician is

recommended on an annual basis.

This exam should not preclude additional follow-up of suspicious palpable abnormalities.



Note on Denisse scores and lifetime risk:

1. A Denisse score greater than 3% is considered moderate risk. If this is the case, consider

specialist referral to assess eligibility for a risk reducing agent.

2. If overall lifetime risk for the development of breast cancer is 20% or higher, the patient may

qualify for future screening with alternating mammogram and breast MRI.



Electronically signed and approved by: Vivi Tapia M.D. Radiologist

## 2024-10-11 ENCOUNTER — HOSPITAL ENCOUNTER (OUTPATIENT)
Dept: HOSPITAL 47 - RADMAMWWP | Age: 49
Discharge: HOME | End: 2024-10-11
Attending: FAMILY MEDICINE
Payer: COMMERCIAL

## 2024-10-11 PROCEDURE — 77063 BREAST TOMOSYNTHESIS BI: CPT

## 2024-10-11 PROCEDURE — 77067 SCR MAMMO BI INCL CAD: CPT

## 2024-10-14 NOTE — MM
Reason for Exam: Screening  (asymptomatic). 

Last screening mammogram was performed 12 month(s) ago.





Patient History: 

Menarche at age 10. First Full-Term Pregnancy at age 15. Hysterectomy at age 38. Patient has history

of breast feeding. 





Risk Values: 

Denisse 5 year model risk: 0.7%.

NCI Lifetime model risk: 6.4%.





Prior Study Comparison: 

7/26/2021 Bilateral Screening Mammogram, [facility]. 9/16/2022 Bilateral MG 3D screening mammo w/cad, [facility].

10/10/2023 Bilateral MG 3D screening mammo w/cad, [facility]. 





Tissue Density: 

There are scattered areas of fibroglandular density.





Findings: 

Analyzed By CAD. 

Right breast: There is no suspicious group of microcalcifications or new suspicious mass.



Left breast: There is no suspicious group of microcalcifications or new suspicious mass. 





Overall Assessment: Negative, BI-RAD 1





Management: 

Screening Mammogram of both breasts in 1 year.

[facility] will attempt to contact patient to return for supplemental views and

ultrasound if indicated.



Patient should continue monthly self-breast exams.  A clinical breast exam by your physician is

recommended on an annual basis.

This exam should not preclude additional follow-up of suspicious palpable abnormalities.



Note on Denisse scores and lifetime risk:

1. A Denisse score greater than 3% is considered moderate risk. If this is the case, consider

specialist referral to assess eligibility for a risk reducing agent.

2. If overall lifetime risk for the development of breast cancer is 20% or higher, the patient may

qualify for future screening with alternating mammogram and breast MRI.



[facility], Workstation: [id number], 10/14/2024 8:55 AM.



Electronically signed and approved by: Bronson Vizcaino DO